# Patient Record
Sex: MALE | Race: WHITE | NOT HISPANIC OR LATINO | Employment: OTHER | URBAN - METROPOLITAN AREA
[De-identification: names, ages, dates, MRNs, and addresses within clinical notes are randomized per-mention and may not be internally consistent; named-entity substitution may affect disease eponyms.]

---

## 2023-08-29 ENCOUNTER — OFFICE VISIT (OUTPATIENT)
Dept: NEUROLOGY | Facility: CLINIC | Age: 73
End: 2023-08-29
Payer: MEDICARE

## 2023-08-29 VITALS
OXYGEN SATURATION: 98 % | DIASTOLIC BLOOD PRESSURE: 60 MMHG | HEIGHT: 68 IN | HEART RATE: 50 BPM | WEIGHT: 151.4 LBS | SYSTOLIC BLOOD PRESSURE: 122 MMHG | BODY MASS INDEX: 22.94 KG/M2

## 2023-08-29 DIAGNOSIS — R55 SYNCOPE AND COLLAPSE: Primary | ICD-10-CM

## 2023-08-29 PROCEDURE — 1159F MED LIST DOCD IN RCRD: CPT | Performed by: PSYCHIATRY & NEUROLOGY

## 2023-08-29 PROCEDURE — 99204 OFFICE O/P NEW MOD 45 MIN: CPT | Performed by: PSYCHIATRY & NEUROLOGY

## 2023-08-29 PROCEDURE — 1160F RVW MEDS BY RX/DR IN RCRD: CPT | Performed by: PSYCHIATRY & NEUROLOGY

## 2023-08-29 RX ORDER — MIDODRINE HYDROCHLORIDE 2.5 MG/1
2.5 TABLET ORAL
COMMUNITY

## 2023-08-29 RX ORDER — LANOLIN ALCOHOL/MO/W.PET/CERES
800 CREAM (GRAM) TOPICAL DAILY
COMMUNITY

## 2023-08-29 RX ORDER — CETIRIZINE HYDROCHLORIDE 10 MG/1
10 TABLET ORAL DAILY
COMMUNITY

## 2023-08-29 RX ORDER — LANOLIN ALCOHOL/MO/W.PET/CERES
1000 CREAM (GRAM) TOPICAL DAILY
COMMUNITY

## 2023-08-29 RX ORDER — DONEPEZIL HYDROCHLORIDE 10 MG/1
10 TABLET, FILM COATED ORAL NIGHTLY
COMMUNITY

## 2023-08-29 RX ORDER — DAPAGLIFLOZIN 10 MG/1
TABLET, FILM COATED ORAL
COMMUNITY

## 2023-08-29 RX ORDER — LEVOTHYROXINE SODIUM 0.05 MG/1
50 TABLET ORAL DAILY
COMMUNITY

## 2023-08-29 NOTE — PROGRESS NOTES
Notes by MA:  Pt presents today with wife, Emy, pt confirms consent to speak with her.      Subjective:     Dear Magi, thank you for referring Mr. Rosa for neurological consultation.  As you know, he is a 73-year-old right-handed gentleman sent for evaluation of dizziness and syncope.  When he extends his neck to look up, he sees bright flashing lights and then loses consciousness.  He has had associated bowel and bladder incontinence with these events.  No convulsions.  No tongue biting.  No clear history of epilepsy.  He does have a long history of hypertension and at least a 40-year history of cigarette smoking.  They also tell me that he was diagnosed with dementia in [place].  He was placed on a blood thinner (Eliquis) for reported history of stroke and blood flow issues.  Neither of them remembers whether he was diagnosed with atrial fibrillation or not.  He does not take aspirin.  Patient ID: Vinod Rosa is a 73 y.o. male.    History of Present Illness  The following portions of the patient's history were reviewed and updated as appropriate: allergies, past family history, past medical history, past social history, past surgical history, and problem list.    Review of Systems   Constitutional:  Positive for fatigue. Negative for activity change and appetite change.   HENT:  Negative for facial swelling, trouble swallowing and voice change.    Eyes:  Positive for visual disturbance (sometimes if he looks up it gets really bright). Negative for photophobia and pain.   Respiratory:  Positive for shortness of breath and wheezing. Negative for chest tightness.    Cardiovascular:  Negative for chest pain, palpitations and leg swelling.   Gastrointestinal:  Negative for abdominal pain, nausea and vomiting.   Endocrine: Positive for heat intolerance (tendency to pass out in the heat). Negative for cold intolerance.   Musculoskeletal:  Positive for arthralgias, back pain, gait problem, joint swelling,  neck pain and neck stiffness. Negative for myalgias.   Neurological:  Positive for dizziness, tremors, syncope, weakness and light-headedness. Negative for seizures, facial asymmetry, speech difficulty, numbness and headaches.   Hematological:  Bruises/bleeds easily.   Psychiatric/Behavioral:  Positive for confusion and decreased concentration. Negative for agitation, behavioral problems, dysphoric mood, hallucinations, self-injury, sleep disturbance and suicidal ideas. The patient is nervous/anxious. The patient is not hyperactive.       Objective:    Neurologic Exam  Awake and alert, bradyphrenic.  Speech is slow but intelligible.  Speech comprehension is reasonable.  Follows simple commands well.    Cranial nerves II through XII normal and symmetric.    Motor exam reveals reasonable symmetric tone bulk and power without drift or spasticity.  No clear lateralizing features.    Sensory exam reveals symmetric sensation to primary modalities.    Coordination testing reveals slow but accurate finger-nose-finger bilaterally.  Slow but rhythmic rapid alternating movements are noted.  His gait is slow and a little bit wide-based with multiple orthopedic features.  He walks with a slumped posture and appears to have knee and back pain limiting his ability to walk.    Tendon reflexes are 1+ and symmetric throughout.  Physical Exam    Assessment/Plan:  I did not hear carotid tones.  Cardiac rhythm is regular currently.  Abdomen is thin and soft.  No significant extremity edema or cyanosis.   Diagnoses and all orders for this visit:    1. Syncope and collapse (Primary)  -     CT Angiogram Head; Future  -     CT Angiogram Carotids; Future     73-year-old gentleman with multiple cerebrovascular risk factors who has provoked (from neck extension) occipital lobe symptoms and syncope.  Moving ahead with CT angiography of the neck and head to evaluate the vertebral arteries.  He is continuing on his antidepressants and  anticoagulants unchanged for now.  I will review the results and take any further measures that may be necessary we will see him back in short order to check on his progress.  In the meantime, I have advised him not to extend his neck.  He understands the risk of continued tobacco abuse.  He also has positive orthostatics today probably contributing to the above symptoms.  Increase hydration is recommended.  He is already on midodrine.    He also has a history (reported) of dementia, presumably vascular in nature.    Thank you very much for the opportunity to participate in his care.      Addendum: His insurance has declined CT angiography requiring that we begin with carotid ultrasonography.  I will make those arrangements for him and move ahead with appropriate follow-up imaging if necessary.

## 2023-09-26 ENCOUNTER — HOSPITAL ENCOUNTER (OUTPATIENT)
Dept: ULTRASOUND IMAGING | Facility: HOSPITAL | Age: 73
Discharge: HOME | End: 2023-09-26
Admitting: PSYCHIATRY & NEUROLOGY
Payer: MEDICARE

## 2023-09-26 DIAGNOSIS — R55 SYNCOPE AND COLLAPSE: ICD-10-CM

## 2023-09-26 PROCEDURE — 93880 EXTRACRANIAL BILAT STUDY: CPT

## 2023-10-05 ENCOUNTER — TELEPHONE (OUTPATIENT)
Dept: NEUROLOGY | Facility: CLINIC | Age: 73
End: 2023-10-05
Payer: MEDICARE

## 2023-10-05 NOTE — TELEPHONE ENCOUNTER
----- Message from Garry Ortiz MD sent at 10/4/2023  4:30 PM EDT -----  Carotid ultrasound looks good.  No significant narrowing.  No changes are necessary.

## 2023-10-13 ENCOUNTER — HOSPITAL ENCOUNTER (OUTPATIENT)
Dept: CT IMAGING | Facility: HOSPITAL | Age: 73
Discharge: HOME | End: 2023-10-13
Admitting: PSYCHIATRY & NEUROLOGY
Payer: MEDICARE

## 2023-10-13 DIAGNOSIS — R55 SYNCOPE AND COLLAPSE: ICD-10-CM

## 2023-10-13 LAB — CREAT BLDA-MCNC: 1.8 MG/DL (ref 0.6–1.3)

## 2023-10-13 PROCEDURE — 25510000001 IOPAMIDOL PER 1 ML: Performed by: PSYCHIATRY & NEUROLOGY

## 2023-10-13 PROCEDURE — 70496 CT ANGIOGRAPHY HEAD: CPT

## 2023-10-13 PROCEDURE — 82565 ASSAY OF CREATININE: CPT

## 2023-10-13 PROCEDURE — 70498 CT ANGIOGRAPHY NECK: CPT

## 2023-10-13 RX ADMIN — IOPAMIDOL 100 ML: 755 INJECTION, SOLUTION INTRAVENOUS at 09:30

## 2023-10-20 ENCOUNTER — TELEPHONE (OUTPATIENT)
Dept: NEUROLOGY | Facility: CLINIC | Age: 73
End: 2023-10-20
Payer: MEDICARE

## 2023-10-20 DIAGNOSIS — I77.3 FIBROMUSCULAR DYSPLASIA OF CAROTID ARTERY: Primary | ICD-10-CM

## 2023-10-20 NOTE — TELEPHONE ENCOUNTER
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  Recommend referral to vascular surgery for further evaluation.

## 2023-10-20 NOTE — TELEPHONE ENCOUNTER
Reviewed results with pt and wife. Advised Dr. Ortiz's recommendation to follow up with vascular surgery. Pt/wife v/u and agreement.     Referral placed.

## 2023-11-20 ENCOUNTER — TELEPHONE (OUTPATIENT)
Dept: NEUROLOGY | Facility: CLINIC | Age: 73
End: 2023-11-20
Payer: MEDICARE

## 2023-11-20 NOTE — TELEPHONE ENCOUNTER
11/20/2023 LM that he will need to call [facility] to see if he has [employer] benefits, he has the HMO plan, told him to let them know that he has a FU appointment and to call me back if he has any questions

## 2023-11-27 ENCOUNTER — TELEPHONE (OUTPATIENT)
Dept: NEUROLOGY | Facility: CLINIC | Age: 73
End: 2023-11-27
Payer: MEDICARE

## 2023-11-27 ENCOUNTER — OFFICE VISIT (OUTPATIENT)
Dept: NEUROLOGY | Facility: CLINIC | Age: 73
End: 2023-11-27
Payer: MEDICARE

## 2023-11-27 VITALS — SYSTOLIC BLOOD PRESSURE: 130 MMHG | HEART RATE: 57 BPM | OXYGEN SATURATION: 97 % | DIASTOLIC BLOOD PRESSURE: 64 MMHG

## 2023-11-27 DIAGNOSIS — I95.1 ORTHOSTATIC HYPOTENSION: Primary | ICD-10-CM

## 2023-11-27 PROCEDURE — 1159F MED LIST DOCD IN RCRD: CPT | Performed by: PSYCHIATRY & NEUROLOGY

## 2023-11-27 PROCEDURE — 1160F RVW MEDS BY RX/DR IN RCRD: CPT | Performed by: PSYCHIATRY & NEUROLOGY

## 2023-11-27 PROCEDURE — 99214 OFFICE O/P EST MOD 30 MIN: CPT | Performed by: PSYCHIATRY & NEUROLOGY

## 2023-11-27 RX ORDER — MIDODRINE HYDROCHLORIDE 2.5 MG/1
2.5 TABLET ORAL
Qty: 270 TABLET | Refills: 3 | Status: SHIPPED | OUTPATIENT
Start: 2023-11-27

## 2023-11-27 NOTE — TELEPHONE ENCOUNTER
11/27/2023 I Spoke to Erasto [facility], he says that Dr Ortiz is in network, I told him that the [facility] would not be in network, he insisted that they were in Network, REF# [id number],

## 2023-11-27 NOTE — PROGRESS NOTES
Notes by MA:  PT is here today for a follow up on CT scans and ultrasound. His wife, Emy is with him today. They state that he has been feeling about the same since his last appointment.       Subjective:     Patient ID: Vinod Rosa is a 73 y.o. male.    History of Present Illness  The following portions of the patient's history were reviewed and updated as appropriate: allergies, current medications, past family history, past medical history, past social history, past surgical history, and problem list.    Review of Systems   Musculoskeletal:  Positive for gait problem (in a wheelchair today).   Neurological:  Positive for syncope and weakness (both legs). Negative for dizziness, seizures, facial asymmetry, speech difficulty, light-headedness, numbness and headaches.   Psychiatric/Behavioral:  Negative for agitation, behavioral problems, confusion, decreased concentration, dysphoric mood, hallucinations, self-injury, sleep disturbance and suicidal ideas. The patient is not nervous/anxious and is not hyperactive.         Objective:    Neurologic Exam  Awake and alert, bradyphrenic.  Speech is slow but intelligible.  Speech comprehension is reasonable.  Follows simple commands well.     Cranial nerves II through XII normal and symmetric.     Motor exam reveals reasonable symmetric tone bulk and power without drift or spasticity.  No clear lateralizing features.     Sensory exam reveals symmetric sensation to primary modalities.     Coordination testing reveals slow but accurate finger-nose-finger bilaterally.  Slow but rhythmic rapid alternating movements are noted.  His gait is slow and a little bit wide-based with multiple orthopedic features.  He walks with a slumped posture and appears to have knee and back pain limiting his ability to walk.     Tendon reflexes are 1+ and symmetric throughout.  Physical Exam    Assessment/Plan:     Diagnoses and all orders for this visit:    1. Orthostatic hypotension  (Primary)    Other orders  -     midodrine (PROAMATINE) 2.5 MG tablet; Take 1 tablet by mouth 3 (Three) Times a Day Before Meals.  Dispense: 270 tablet; Refill: 3     Reassuringly, his carotid ultrasound and CT angiogram of the neck and head did not reveal any significant vertebrobasilar stenosis (he did have a little bit of beading/fibromuscular hyperplasia within the left internal carotid, that I do not think is referable to his current presentation).  He is also now not complaining of the occipital symptoms such as flashing lights that he describes earlier.  He is still having episodes of dizziness and falls that primarily involve getting up and moving about.  This is all consistent with orthostatic hypotension which we did demonstrate at our last visit.  I had another discussion with him about this.  I recommended that he restart his midodrine at 2.5 mg 3 times daily and increase his hydration.  Overall reassuring findings today.  Happy to see him back as needed.

## 2024-09-15 DIAGNOSIS — I95.1 ORTHOSTATIC HYPOTENSION: Primary | ICD-10-CM

## 2024-09-16 RX ORDER — MIDODRINE HYDROCHLORIDE 2.5 MG/1
TABLET ORAL
Qty: 270 TABLET | Refills: 0 | Status: SHIPPED | OUTPATIENT
Start: 2024-09-16

## 2024-11-28 DIAGNOSIS — I95.1 ORTHOSTATIC HYPOTENSION: ICD-10-CM

## 2024-12-02 RX ORDER — MIDODRINE HYDROCHLORIDE 2.5 MG/1
TABLET ORAL
Qty: 270 TABLET | Refills: 3 | OUTPATIENT
Start: 2024-12-02